# Patient Record
Sex: FEMALE | Race: WHITE | Employment: UNEMPLOYED | URBAN - METROPOLITAN AREA
[De-identification: names, ages, dates, MRNs, and addresses within clinical notes are randomized per-mention and may not be internally consistent; named-entity substitution may affect disease eponyms.]

---

## 2017-03-21 ENCOUNTER — HOSPITAL ENCOUNTER (OUTPATIENT)
Dept: ULTRASOUND IMAGING | Age: 47
Discharge: HOME | End: 2017-03-21

## 2017-03-21 ENCOUNTER — HOSPITAL ENCOUNTER (OUTPATIENT)
Dept: OTHER | Age: 47
Discharge: HOME | End: 2017-03-21
Attending: OBSTETRICS & GYNECOLOGY | Admitting: OBSTETRICS & GYNECOLOGY

## 2017-03-21 DIAGNOSIS — R10.2 PELVIC PAIN IN FEMALE: ICD-10-CM

## 2018-01-05 PROBLEM — R10.2 PELVIC PAIN IN FEMALE: Status: ACTIVE | Noted: 2018-01-05

## 2018-01-06 PROBLEM — N70.93 TUBO-OVARIAN ABSCESS: Status: ACTIVE | Noted: 2018-01-06

## 2018-01-06 PROBLEM — N70.93 LEFT TUBO-OVARIAN ABSCESS: Status: ACTIVE | Noted: 2018-01-06

## 2018-01-09 PROBLEM — D64.9 ANEMIA: Status: ACTIVE | Noted: 2018-01-09

## 2018-01-09 PROBLEM — I10 HTN (HYPERTENSION): Status: ACTIVE | Noted: 2018-01-09

## 2018-01-17 PROBLEM — T81.31XA EXTERNAL INCISIONAL DEHISCENCE: Status: ACTIVE | Noted: 2018-01-17

## 2019-09-05 ENCOUNTER — APPOINTMENT (OUTPATIENT)
Dept: GENERAL RADIOLOGY | Age: 49
End: 2019-09-05
Payer: COMMERCIAL

## 2019-09-05 ENCOUNTER — HOSPITAL ENCOUNTER (EMERGENCY)
Age: 49
Discharge: NURSING, REHAB OR LONG-TERM CARE | End: 2019-09-05
Attending: EMERGENCY MEDICINE
Payer: COMMERCIAL

## 2019-09-05 ENCOUNTER — APPOINTMENT (OUTPATIENT)
Dept: CT IMAGING | Age: 49
End: 2019-09-05
Payer: COMMERCIAL

## 2019-09-05 VITALS
HEART RATE: 80 BPM | TEMPERATURE: 98.6 F | WEIGHT: 145 LBS | DIASTOLIC BLOOD PRESSURE: 93 MMHG | OXYGEN SATURATION: 93 % | RESPIRATION RATE: 16 BRPM | SYSTOLIC BLOOD PRESSURE: 153 MMHG | BODY MASS INDEX: 23.4 KG/M2

## 2019-09-05 DIAGNOSIS — R55 NEAR SYNCOPE: Primary | ICD-10-CM

## 2019-09-05 DIAGNOSIS — I16.1 HYPERTENSIVE EMERGENCY: ICD-10-CM

## 2019-09-05 LAB
A/G RATIO: 1.2 (ref 1.1–2.2)
ALBUMIN SERPL-MCNC: 4.2 G/DL (ref 3.4–5)
ALP BLD-CCNC: 94 U/L (ref 40–129)
ALT SERPL-CCNC: 21 U/L (ref 10–40)
AMPHETAMINE SCREEN, URINE: ABNORMAL
ANION GAP SERPL CALCULATED.3IONS-SCNC: 9 MMOL/L (ref 3–16)
AST SERPL-CCNC: 21 U/L (ref 15–37)
BARBITURATE SCREEN URINE: ABNORMAL
BASOPHILS ABSOLUTE: 0 K/UL (ref 0–0.2)
BASOPHILS RELATIVE PERCENT: 0.9 %
BENZODIAZEPINE SCREEN, URINE: ABNORMAL
BILIRUB SERPL-MCNC: <0.2 MG/DL (ref 0–1)
BILIRUBIN URINE: NEGATIVE
BLOOD, URINE: NEGATIVE
BUN BLDV-MCNC: 10 MG/DL (ref 7–20)
CALCIUM SERPL-MCNC: 9.1 MG/DL (ref 8.3–10.6)
CANNABINOID SCREEN URINE: POSITIVE
CHLORIDE BLD-SCNC: 102 MMOL/L (ref 99–110)
CLARITY: CLEAR
CO2: 30 MMOL/L (ref 21–32)
COCAINE METABOLITE SCREEN URINE: ABNORMAL
COLOR: YELLOW
CREAT SERPL-MCNC: 0.6 MG/DL (ref 0.6–1.1)
EKG ATRIAL RATE: 70 BPM
EKG DIAGNOSIS: NORMAL
EKG P AXIS: 18 DEGREES
EKG P-R INTERVAL: 136 MS
EKG Q-T INTERVAL: 412 MS
EKG QRS DURATION: 86 MS
EKG QTC CALCULATION (BAZETT): 444 MS
EKG R AXIS: 88 DEGREES
EKG T AXIS: 46 DEGREES
EKG VENTRICULAR RATE: 70 BPM
EOSINOPHILS ABSOLUTE: 0.1 K/UL (ref 0–0.6)
EOSINOPHILS RELATIVE PERCENT: 2 %
ETHANOL: NORMAL MG/DL (ref 0–0.08)
GFR AFRICAN AMERICAN: >60
GFR NON-AFRICAN AMERICAN: >60
GLOBULIN: 3.6 G/DL
GLUCOSE BLD-MCNC: 109 MG/DL (ref 70–99)
GLUCOSE URINE: NEGATIVE MG/DL
HCT VFR BLD CALC: 39.9 % (ref 36–48)
HEMOGLOBIN: 13.8 G/DL (ref 12–16)
INR BLD: 0.93 (ref 0.86–1.14)
KETONES, URINE: NEGATIVE MG/DL
LEUKOCYTE ESTERASE, URINE: NEGATIVE
LYMPHOCYTES ABSOLUTE: 2 K/UL (ref 1–5.1)
LYMPHOCYTES RELATIVE PERCENT: 38.9 %
Lab: ABNORMAL
MAGNESIUM: 2.2 MG/DL (ref 1.8–2.4)
MCH RBC QN AUTO: 32.5 PG (ref 26–34)
MCHC RBC AUTO-ENTMCNC: 34.7 G/DL (ref 31–36)
MCV RBC AUTO: 93.6 FL (ref 80–100)
METHADONE SCREEN, URINE: ABNORMAL
MICROSCOPIC EXAMINATION: NORMAL
MONOCYTES ABSOLUTE: 0.5 K/UL (ref 0–1.3)
MONOCYTES RELATIVE PERCENT: 10.4 %
NEUTROPHILS ABSOLUTE: 2.4 K/UL (ref 1.7–7.7)
NEUTROPHILS RELATIVE PERCENT: 47.8 %
NITRITE, URINE: NEGATIVE
OPIATE SCREEN URINE: ABNORMAL
OXYCODONE URINE: ABNORMAL
PDW BLD-RTO: 14.2 % (ref 12.4–15.4)
PH UA: 7.5
PH UA: 7.5 (ref 5–8)
PHENCYCLIDINE SCREEN URINE: ABNORMAL
PLATELET # BLD: 209 K/UL (ref 135–450)
PMV BLD AUTO: 8.5 FL (ref 5–10.5)
POTASSIUM REFLEX MAGNESIUM: 3.5 MMOL/L (ref 3.5–5.1)
PROPOXYPHENE SCREEN: ABNORMAL
PROTEIN UA: NEGATIVE MG/DL
PROTHROMBIN TIME: 10.6 SEC (ref 9.8–13)
RBC # BLD: 4.26 M/UL (ref 4–5.2)
SODIUM BLD-SCNC: 141 MMOL/L (ref 136–145)
SPECIFIC GRAVITY UA: 1.01 (ref 1–1.03)
T4 TOTAL: 6.2 UG/DL (ref 4.5–10.9)
TOTAL PROTEIN: 7.8 G/DL (ref 6.4–8.2)
TROPONIN: <0.01 NG/ML
TSH SERPL DL<=0.05 MIU/L-ACNC: 1.82 UIU/ML (ref 0.27–4.2)
URINE TYPE: NORMAL
UROBILINOGEN, URINE: 0.2 E.U./DL
WBC # BLD: 5.1 K/UL (ref 4–11)

## 2019-09-05 PROCEDURE — 84436 ASSAY OF TOTAL THYROXINE: CPT

## 2019-09-05 PROCEDURE — 84484 ASSAY OF TROPONIN QUANT: CPT

## 2019-09-05 PROCEDURE — 2500000003 HC RX 250 WO HCPCS: Performed by: EMERGENCY MEDICINE

## 2019-09-05 PROCEDURE — 96375 TX/PRO/DX INJ NEW DRUG ADDON: CPT

## 2019-09-05 PROCEDURE — 71045 X-RAY EXAM CHEST 1 VIEW: CPT

## 2019-09-05 PROCEDURE — 93005 ELECTROCARDIOGRAM TRACING: CPT | Performed by: EMERGENCY MEDICINE

## 2019-09-05 PROCEDURE — 80053 COMPREHEN METABOLIC PANEL: CPT

## 2019-09-05 PROCEDURE — 70450 CT HEAD/BRAIN W/O DYE: CPT

## 2019-09-05 PROCEDURE — 81003 URINALYSIS AUTO W/O SCOPE: CPT

## 2019-09-05 PROCEDURE — 99284 EMERGENCY DEPT VISIT MOD MDM: CPT

## 2019-09-05 PROCEDURE — 6360000002 HC RX W HCPCS: Performed by: EMERGENCY MEDICINE

## 2019-09-05 PROCEDURE — 85025 COMPLETE CBC W/AUTO DIFF WBC: CPT

## 2019-09-05 PROCEDURE — 96374 THER/PROPH/DIAG INJ IV PUSH: CPT

## 2019-09-05 PROCEDURE — 93010 ELECTROCARDIOGRAM REPORT: CPT | Performed by: INTERNAL MEDICINE

## 2019-09-05 PROCEDURE — 83735 ASSAY OF MAGNESIUM: CPT

## 2019-09-05 PROCEDURE — 96361 HYDRATE IV INFUSION ADD-ON: CPT

## 2019-09-05 PROCEDURE — 84443 ASSAY THYROID STIM HORMONE: CPT

## 2019-09-05 PROCEDURE — 2580000003 HC RX 258: Performed by: EMERGENCY MEDICINE

## 2019-09-05 PROCEDURE — 80307 DRUG TEST PRSMV CHEM ANLYZR: CPT

## 2019-09-05 PROCEDURE — 85610 PROTHROMBIN TIME: CPT

## 2019-09-05 PROCEDURE — G0480 DRUG TEST DEF 1-7 CLASSES: HCPCS

## 2019-09-05 RX ORDER — SODIUM CHLORIDE 9 MG/ML
INJECTION, SOLUTION INTRAVENOUS CONTINUOUS
Status: DISCONTINUED | OUTPATIENT
Start: 2019-09-05 | End: 2019-09-05 | Stop reason: HOSPADM

## 2019-09-05 RX ORDER — OMEPRAZOLE 40 MG/1
40 CAPSULE, DELAYED RELEASE ORAL DAILY
COMMUNITY

## 2019-09-05 RX ORDER — MELOXICAM 15 MG/1
15 TABLET ORAL DAILY
COMMUNITY

## 2019-09-05 RX ORDER — MIRTAZAPINE 15 MG/1
15 TABLET, FILM COATED ORAL NIGHTLY
COMMUNITY

## 2019-09-05 RX ORDER — ONDANSETRON 4 MG/1
4 TABLET, FILM COATED ORAL EVERY 8 HOURS PRN
COMMUNITY

## 2019-09-05 RX ORDER — ESTRADIOL 0.03 MG/D
1 FILM, EXTENDED RELEASE TRANSDERMAL
COMMUNITY

## 2019-09-05 RX ORDER — PAROXETINE 10 MG/1
10 TABLET, FILM COATED ORAL EVERY MORNING
COMMUNITY

## 2019-09-05 RX ORDER — FLUDROCORTISONE ACETATE 0.1 MG/1
0.1 TABLET ORAL DAILY
COMMUNITY

## 2019-09-05 RX ORDER — PROMETHAZINE HYDROCHLORIDE 12.5 MG/1
12.5 TABLET ORAL EVERY 6 HOURS PRN
COMMUNITY

## 2019-09-05 RX ORDER — ONDANSETRON 2 MG/ML
4 INJECTION INTRAMUSCULAR; INTRAVENOUS ONCE
Status: COMPLETED | OUTPATIENT
Start: 2019-09-05 | End: 2019-09-05

## 2019-09-05 RX ORDER — ROPINIROLE 1 MG/1
1 TABLET, FILM COATED ORAL 3 TIMES DAILY
COMMUNITY

## 2019-09-05 RX ORDER — LISINOPRIL AND HYDROCHLOROTHIAZIDE 20; 12.5 MG/1; MG/1
1 TABLET ORAL DAILY
COMMUNITY

## 2019-09-05 RX ADMIN — ONDANSETRON 4 MG: 2 INJECTION INTRAMUSCULAR; INTRAVENOUS at 10:53

## 2019-09-05 RX ADMIN — LABETALOL HYDROCHLORIDE 10 MG: 5 INJECTION INTRAVENOUS at 10:57

## 2019-09-05 RX ADMIN — SODIUM CHLORIDE: 9 INJECTION, SOLUTION INTRAVENOUS at 10:53

## 2019-09-05 ASSESSMENT — PAIN DESCRIPTION - PAIN TYPE: TYPE: ACUTE PAIN

## 2019-09-05 ASSESSMENT — PAIN SCALES - GENERAL
PAINLEVEL_OUTOF10: 6
PAINLEVEL_OUTOF10: 0

## 2019-09-05 ASSESSMENT — PAIN DESCRIPTION - LOCATION: LOCATION: ARM

## 2019-09-05 ASSESSMENT — PAIN DESCRIPTION - ORIENTATION: ORIENTATION: LEFT

## 2019-09-05 NOTE — ED PROVIDER NOTES
Triage Chief Complaint:   Loss of Consciousness (Pt was referred to come to ER from [facility] for hypertension. She was seen this morning and SBP was over 200. Pt arrived to [facility] and passed out. Pt c/ headache, dizziness, arm tingling. )    [place]:  Aguilar Guallpa is a 44 y.o. female that presents after near syncopal episode in the [facility]. Patient apparently has recently been diagnosed with hypertension. She was started on lisinopril with HCTZ 3 days ago. She did take a dose this morning. Has not felt well for a month. Has had intermittent episodes of chest pain with the last episode a week or so ago. Has had some tingling in her arms especially on the left. Has had pressure in her head. Has been having intermittent lightheadedness. Went to [facility] where she is medicated with Suboxone. 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 Denies any substance abuse currently. States that when she went this morning they noticed her blood pressure was very elevated with a systolic over 619. Was directed to the ER however she and her  accidentally went to the [facility] and while she was there she got very dizzy and lightheaded. Initially were told she lost consciousness but she and her  both tell me she did not fully lose consciousness. She almost did. States she just does not feel well. Has had some intermittent nausea with some vomiting last week but none currently. No diarrhea. Denies current chest pain or abdominal pain. Pressure in her head has been ongoing for a month. No other specific complaints. ROS:  CONSTITUTIONAL: Denies fever, chills, sweats, weight loss. General malaise and not feeling well for a month. EYES: No visual disturbance.   No drainage, swelling, or pain  ENT: No ear pain or drainage, nasal congestion or bleeding, sore throat or difficulty swallowing  NECK: No neck pain, swelling, or mass  PULMONARY: No difficulty breathing, cough or %    Basophils % 0.9 %    Neutrophils Absolute 2.4 1.7 - 7.7 K/uL    Lymphocytes Absolute 2.0 1.0 - 5.1 K/uL    Monocytes Absolute 0.5 0.0 - 1.3 K/uL    Eosinophils Absolute 0.1 0.0 - 0.6 K/uL    Basophils Absolute 0.0 0.0 - 0.2 K/uL   Comprehensive Metabolic Panel w/ Reflex to MG   Result Value Ref Range    Sodium 141 136 - 145 mmol/L    Potassium reflex Magnesium 3.5 3.5 - 5.1 mmol/L    Chloride 102 99 - 110 mmol/L    CO2 30 21 - 32 mmol/L    Anion Gap 9 3 - 16    Glucose 109 (H) 70 - 99 mg/dL    BUN 10 7 - 20 mg/dL    CREATININE 0.6 0.6 - 1.1 mg/dL    GFR Non-African American >60 >60    GFR African American >60 >60    Calcium 9.1 8.3 - 10.6 mg/dL    Total Protein 7.8 6.4 - 8.2 g/dL    Alb 4.2 3.4 - 5.0 g/dL    Albumin/Globulin Ratio 1.2 1.1 - 2.2    Total Bilirubin <0.2 0.0 - 1.0 mg/dL    Alkaline Phosphatase 94 40 - 129 U/L    ALT 21 10 - 40 U/L    AST 21 15 - 37 U/L    Globulin 3.6 g/dL   Troponin   Result Value Ref Range    Troponin <0.01 <0.01 ng/mL   Protime-INR   Result Value Ref Range    Protime 10.6 9.8 - 13.0 sec    INR 0.93 0.86 - 1.14   TSH without Reflex   Result Value Ref Range    TSH 1.82 0.27 - 4.20 uIU/mL   Urinalysis, reflex to microscopic   Result Value Ref Range    Color, UA Yellow Straw/Yellow    Clarity, UA Clear Clear    Glucose, Ur Negative Negative mg/dL    Bilirubin Urine Negative Negative    Ketones, Urine Negative Negative mg/dL    Specific Gravity, UA 1.010 1.005 - 1.030    Blood, Urine Negative Negative    pH, UA 7.5 5.0 - 8.0    Protein, UA Negative Negative mg/dL    Urobilinogen, Urine 0.2 <2.0 E.U./dL    Nitrite, Urine Negative Negative    Leukocyte Esterase, Urine Negative Negative    Microscopic Examination Not Indicated     Urine Type Not Specified    Magnesium   Result Value Ref Range    Magnesium 2.20 1.80 - 2.40 mg/dL   EKG 12 Lead   Result Value Ref Range    Ventricular Rate 70 BPM    Atrial Rate 70 BPM    P-R Interval 136 ms    QRS Duration 86 ms    Q-T Interval 412 ms

## 2022-07-04 ENCOUNTER — HOSPITAL ENCOUNTER (EMERGENCY)
Age: 52
Discharge: HOME | End: 2022-07-04
Payer: COMMERCIAL

## 2022-07-04 VITALS
OXYGEN SATURATION: 100 % | RESPIRATION RATE: 16 BRPM | TEMPERATURE: 98.2 F | DIASTOLIC BLOOD PRESSURE: 104 MMHG | HEART RATE: 84 BPM | SYSTOLIC BLOOD PRESSURE: 163 MMHG

## 2022-07-04 DIAGNOSIS — F69 BEHAVIOR CONCERN IN ADULT: Primary | ICD-10-CM

## 2022-07-04 DIAGNOSIS — R44.3 HALLUCINATION: ICD-10-CM

## 2022-07-04 LAB
A/G RATIO: 1.7 (ref 1.1–2.2)
ACETAMINOPHEN LEVEL: <5 UG/ML (ref 10–30)
ALBUMIN SERPL-MCNC: 4.6 G/DL (ref 3.4–5)
ALP BLD-CCNC: 121 U/L (ref 40–129)
ALT SERPL-CCNC: 20 U/L (ref 10–40)
ANION GAP SERPL CALCULATED.3IONS-SCNC: 12 MMOL/L (ref 3–16)
AST SERPL-CCNC: 22 U/L (ref 15–37)
BASOPHILS ABSOLUTE: 0 K/UL (ref 0–0.2)
BASOPHILS RELATIVE PERCENT: 0.9 %
BILIRUB SERPL-MCNC: 0.3 MG/DL (ref 0–1)
BUN BLDV-MCNC: 16 MG/DL (ref 7–20)
CALCIUM SERPL-MCNC: 9.5 MG/DL (ref 8.3–10.6)
CHLORIDE BLD-SCNC: 103 MMOL/L (ref 99–110)
CO2: 24 MMOL/L (ref 21–32)
CREAT SERPL-MCNC: 0.6 MG/DL (ref 0.6–1.1)
EOSINOPHILS ABSOLUTE: 0 K/UL (ref 0–0.6)
EOSINOPHILS RELATIVE PERCENT: 0.8 %
ETHANOL: NORMAL MG/DL (ref 0–0.08)
GFR AFRICAN AMERICAN: >60
GFR NON-AFRICAN AMERICAN: >60
GLUCOSE BLD-MCNC: 90 MG/DL (ref 70–99)
HCT VFR BLD CALC: 37.9 % (ref 36–48)
HEMOGLOBIN: 12.8 G/DL (ref 12–16)
LYMPHOCYTES ABSOLUTE: 1.5 K/UL (ref 1–5.1)
LYMPHOCYTES RELATIVE PERCENT: 30.3 %
MCH RBC QN AUTO: 30.4 PG (ref 26–34)
MCHC RBC AUTO-ENTMCNC: 33.9 G/DL (ref 31–36)
MCV RBC AUTO: 89.7 FL (ref 80–100)
MONOCYTES ABSOLUTE: 0.3 K/UL (ref 0–1.3)
MONOCYTES RELATIVE PERCENT: 7.1 %
NEUTROPHILS ABSOLUTE: 3 K/UL (ref 1.7–7.7)
NEUTROPHILS RELATIVE PERCENT: 60.9 %
PDW BLD-RTO: 14.4 % (ref 12.4–15.4)
PLATELET # BLD: 210 K/UL (ref 135–450)
PMV BLD AUTO: 8.2 FL (ref 5–10.5)
POTASSIUM REFLEX MAGNESIUM: 3.8 MMOL/L (ref 3.5–5.1)
RBC # BLD: 4.22 M/UL (ref 4–5.2)
SALICYLATE, SERUM: <0.3 MG/DL (ref 15–30)
SODIUM BLD-SCNC: 139 MMOL/L (ref 136–145)
TOTAL PROTEIN: 7.3 G/DL (ref 6.4–8.2)
WBC # BLD: 4.9 K/UL (ref 4–11)

## 2022-07-04 PROCEDURE — 82077 ASSAY SPEC XCP UR&BREATH IA: CPT

## 2022-07-04 PROCEDURE — 80053 COMPREHEN METABOLIC PANEL: CPT

## 2022-07-04 PROCEDURE — 85025 COMPLETE CBC W/AUTO DIFF WBC: CPT

## 2022-07-04 PROCEDURE — 80179 DRUG ASSAY SALICYLATE: CPT

## 2022-07-04 PROCEDURE — 80143 DRUG ASSAY ACETAMINOPHEN: CPT

## 2022-07-04 PROCEDURE — 99283 EMERGENCY DEPT VISIT LOW MDM: CPT

## 2022-07-04 PROCEDURE — 36415 COLL VENOUS BLD VENIPUNCTURE: CPT

## 2022-07-04 ASSESSMENT — ENCOUNTER SYMPTOMS
ABDOMINAL PAIN: 0
COLOR CHANGE: 0
SHORTNESS OF BREATH: 0
FACIAL SWELLING: 0
RHINORRHEA: 0
SORE THROAT: 0

## 2022-07-04 ASSESSMENT — PAIN - FUNCTIONAL ASSESSMENT: PAIN_FUNCTIONAL_ASSESSMENT: 0-10

## 2022-07-04 ASSESSMENT — PAIN SCALES - GENERAL: PAINLEVEL_OUTOF10: 7

## 2022-07-04 ASSESSMENT — PAIN DESCRIPTION - LOCATION: LOCATION: BACK
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
## 2022-07-04 NOTE — ED NOTES
Level of Care Disposition:  Discharge    Patient was seen by ED provider and [facility] staff. The case was presented to psychiatric provider on-call Dr Ioana Soto. Based on the ED evaluation and information presented to the provider by this RN, it is the recommendation of the on call psychiatric provider that the patient be discharged from a psychiatric standpoint with diagnosis of amphetamine intoxication and with the following referrals for follow up for substance abuse issues.         Insurance Pre certification Authorization: N/A     Leilani Jaquez RN  07/04/22 9991

## 2022-07-04 NOTE — ED NOTES
Pt agreeable to blood draw at this time. Tolerated w/ 1 stick to left hand as pt reports she has a lot of scar tissue r/t to past hx of IV drug abuse. Pt admitted to use of methamphetamines yesterday. Reports hx of IV drug abuse, but states only smoked meth yesterday and denies daily use. Pt denies SI,HI or AVH and does not appear to be RTIS. Pt states she wants to sleep and requested lights be turned off as she has a headache.      Cordell Mcgovern, RN  07/04/22 50 Kirill Sargent RN  07/04/22 6108
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
## 2022-07-04 NOTE — ED NOTES
Ambulatory to [facility] accompanied by RN. Pt s/w tearful and answering questions with yes or no responses and unwilling to engage. Instructed on changing and obtaining urine specimen, but pt stated \"I forgot\" when she came from the bathroom. Needed to be directed into in treatment room where she immediately laid in the bed and covered up, appearing to be asleep.      Pham De Souza RN  07/04/22 3015

## 2022-07-18 ENCOUNTER — HOSPITAL ENCOUNTER (EMERGENCY)
Age: 52
Discharge: HOME | End: 2022-07-18
Payer: COMMERCIAL

## 2022-07-18 VITALS
WEIGHT: 150 LBS | TEMPERATURE: 97 F | SYSTOLIC BLOOD PRESSURE: 156 MMHG | RESPIRATION RATE: 16 BRPM | HEART RATE: 88 BPM | DIASTOLIC BLOOD PRESSURE: 88 MMHG | HEIGHT: 66 IN | BODY MASS INDEX: 24.11 KG/M2 | OXYGEN SATURATION: 98 %

## 2022-07-18 DIAGNOSIS — F19.10 POLYSUBSTANCE ABUSE (HCC): Primary | ICD-10-CM

## 2022-07-18 LAB
A/G RATIO: 1.6 (ref 1.1–2.2)
ACETAMINOPHEN LEVEL: <5 UG/ML (ref 10–30)
ALBUMIN SERPL-MCNC: 4.8 G/DL (ref 3.4–5)
ALP BLD-CCNC: 121 U/L (ref 40–129)
ALT SERPL-CCNC: 19 U/L (ref 10–40)
AMORPHOUS: ABNORMAL /HPF
AMPHETAMINE SCREEN, URINE: POSITIVE
ANION GAP SERPL CALCULATED.3IONS-SCNC: 12 MMOL/L (ref 3–16)
AST SERPL-CCNC: 26 U/L (ref 15–37)
BACTERIA: ABNORMAL /HPF
BARBITURATE SCREEN URINE: ABNORMAL
BASOPHILS ABSOLUTE: 0 K/UL (ref 0–0.2)
BASOPHILS RELATIVE PERCENT: 0.6 %
BENZODIAZEPINE SCREEN, URINE: POSITIVE
BILIRUB SERPL-MCNC: 0.3 MG/DL (ref 0–1)
BILIRUBIN URINE: ABNORMAL
BLOOD, URINE: NEGATIVE
BUN BLDV-MCNC: 22 MG/DL (ref 7–20)
CALCIUM SERPL-MCNC: 9.9 MG/DL (ref 8.3–10.6)
CANNABINOID SCREEN URINE: POSITIVE
CHLORIDE BLD-SCNC: 102 MMOL/L (ref 99–110)
CLARITY: CLEAR
CO2: 25 MMOL/L (ref 21–32)
COCAINE METABOLITE SCREEN URINE: ABNORMAL
COLOR: YELLOW
CREAT SERPL-MCNC: 1 MG/DL (ref 0.6–1.1)
EOSINOPHILS ABSOLUTE: 0 K/UL (ref 0–0.6)
EOSINOPHILS RELATIVE PERCENT: 0 %
EPITHELIAL CELLS, UA: ABNORMAL /HPF (ref 0–5)
ETHANOL: NORMAL MG/DL (ref 0–0.08)
GFR AFRICAN AMERICAN: >60
GFR NON-AFRICAN AMERICAN: 58
GLUCOSE BLD-MCNC: 101 MG/DL (ref 70–99)
GLUCOSE URINE: NEGATIVE MG/DL
HCT VFR BLD CALC: 43.9 % (ref 36–48)
HEMOGLOBIN: 14.7 G/DL (ref 12–16)
KETONES, URINE: 15 MG/DL
LEUKOCYTE ESTERASE, URINE: NEGATIVE
LYMPHOCYTES ABSOLUTE: 1.4 K/UL (ref 1–5.1)
LYMPHOCYTES RELATIVE PERCENT: 19.7 %
Lab: ABNORMAL
MCH RBC QN AUTO: 30.1 PG (ref 26–34)
MCHC RBC AUTO-ENTMCNC: 33.4 G/DL (ref 31–36)
MCV RBC AUTO: 90.2 FL (ref 80–100)
METHADONE SCREEN, URINE: ABNORMAL
MICROSCOPIC EXAMINATION: YES
MONOCYTES ABSOLUTE: 0.3 K/UL (ref 0–1.3)
MONOCYTES RELATIVE PERCENT: 4.3 %
MUCUS: ABNORMAL /LPF
NEUTROPHILS ABSOLUTE: 5.4 K/UL (ref 1.7–7.7)
NEUTROPHILS RELATIVE PERCENT: 75.4 %
NITRITE, URINE: NEGATIVE
OPIATE SCREEN URINE: ABNORMAL
OXYCODONE URINE: ABNORMAL
PDW BLD-RTO: 14.4 % (ref 12.4–15.4)
PH UA: 6
PH UA: 6 (ref 5–8)
PHENCYCLIDINE SCREEN URINE: ABNORMAL
PLATELET # BLD: 319 K/UL (ref 135–450)
PMV BLD AUTO: 8.5 FL (ref 5–10.5)
POTASSIUM REFLEX MAGNESIUM: 4 MMOL/L (ref 3.5–5.1)
PROPOXYPHENE SCREEN: ABNORMAL
PROTEIN UA: ABNORMAL MG/DL
RBC # BLD: 4.87 M/UL (ref 4–5.2)
RBC UA: ABNORMAL /HPF (ref 0–4)
SALICYLATE, SERUM: <0.3 MG/DL (ref 15–30)
SODIUM BLD-SCNC: 139 MMOL/L (ref 136–145)
SPECIFIC GRAVITY UA: 1.02 (ref 1–1.03)
TOTAL PROTEIN: 7.8 G/DL (ref 6.4–8.2)
URINE REFLEX TO CULTURE: ABNORMAL
URINE TYPE: ABNORMAL
UROBILINOGEN, URINE: 0.2 E.U./DL
WBC # BLD: 7.2 K/UL (ref 4–11)
WBC UA: ABNORMAL /HPF (ref 0–5)

## 2022-07-18 PROCEDURE — 80143 DRUG ASSAY ACETAMINOPHEN: CPT

## 2022-07-18 PROCEDURE — 82077 ASSAY SPEC XCP UR&BREATH IA: CPT

## 2022-07-18 PROCEDURE — 36415 COLL VENOUS BLD VENIPUNCTURE: CPT

## 2022-07-18 PROCEDURE — 80179 DRUG ASSAY SALICYLATE: CPT

## 2022-07-18 PROCEDURE — 81001 URINALYSIS AUTO W/SCOPE: CPT

## 2022-07-18 PROCEDURE — 80053 COMPREHEN METABOLIC PANEL: CPT

## 2022-07-18 PROCEDURE — 85025 COMPLETE CBC W/AUTO DIFF WBC: CPT

## 2022-07-18 PROCEDURE — 80307 DRUG TEST PRSMV CHEM ANLYZR: CPT

## 2022-07-18 PROCEDURE — 99285 EMERGENCY DEPT VISIT HI MDM: CPT

## 2022-07-18 ASSESSMENT — PAIN DESCRIPTION - LOCATION: LOCATION: BACK

## 2022-07-18 ASSESSMENT — PAIN DESCRIPTION - DESCRIPTORS: DESCRIPTORS: ACHING

## 2022-07-18 ASSESSMENT — PAIN DESCRIPTION - FREQUENCY: FREQUENCY: CONTINUOUS

## 2022-07-18 ASSESSMENT — PAIN SCALES - GENERAL: PAINLEVEL_OUTOF10: 3

## 2022-07-18 ASSESSMENT — ENCOUNTER SYMPTOMS
SHORTNESS OF BREATH: 0
COLOR CHANGE: 0

## 2022-07-18 ASSESSMENT — PAIN DESCRIPTION - PAIN TYPE: TYPE: CHRONIC PAIN

## 2022-07-18 ASSESSMENT — PAIN - FUNCTIONAL ASSESSMENT: PAIN_FUNCTIONAL_ASSESSMENT: 0-10

## 2022-07-18 ASSESSMENT — PAIN DESCRIPTION - ORIENTATION: ORIENTATION: LOWER

## 2022-07-18 NOTE — ED NOTES
Level of Care Disposition: discharge    Patient was seen by ED provider and [facility] staff. The case was presented to psychiatric provider on-call GARRISON Givens.    Based on the ED evaluation and information presented to the provider by [facility] , it is the recommendation of the on call psychiatric provider that the patient be discharged from a psychiatric standpoint with the following referrals: substance abuse referrals          Insurance Pre certification Authorization: JORDYN Jacobo [facility]  07/18/22 6368

## 2022-07-18 NOTE — ED NOTES
Pt has a  order.  spoke to dispatcher [facility] [place] at the [address] office in [place] at [phone] to let know that pt will be discharged.       Isak Asher, [address]  07/18/22 3396

## 2022-07-18 NOTE — ED NOTES
Patient brought back from triage. Patient changed into safety clothing. Patient belongings locked into locker. Patient oriented to [facility]. Will continue to monitor patient.       Yolanda Wilkins RN  07/18/22 5887
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
## 2022-07-18 NOTE — ED TRIAGE NOTES
Pt brought in from care home; after health evaluation staff concerned about pt behavior and delusions. [place] and stating she was going to meet God, but denies S/I or H/I; denies drugs or alcohol use.  Pt is on a bond from [facility]; pink slip taken to [facility]

## 2022-07-18 NOTE — ED NOTES
Patient being discharged home. Matteo Cleverly contacting [facility] office to notify them due to having a  order.       Sue Campo RN  07/18/22 6171

## 2022-07-18 NOTE — ED NOTES
Patient has order to discharge back to [facility]. Patient has  order from .  here to pick patient up. Patient given discharge instructions. Patient states she understands. Patient left to prison with [facility].       Mp Clark RN  07/18/22 5487

## 2022-07-18 NOTE — DISCHARGE INSTRUCTIONS
[facility] is [phone]. You can use this number at any time to access emergency mental health services. The [address] [facility] also offers a 24-hour staff crisis line at [address] [facility]: [phone]  For assistance with finding a rehab in [facility]:  accept all types of insurance  [id number] [place]     [address]:  sliding scale for in [facility] residents only  [facility]   [phone]  [facility] [facility]   [phone]  [facility]:  sliding scale for in [facility] residents only  [facility]   [phone]:  offer sliding scale regardless of residence  [facility]   [phone]:  offer sliding scale regardless of residence  [place]   [address]    [address]:  offer sliding scale regardless of residence  [facility]   [address]:  sliding scale for in [facility] residents only  [facility]   [address]:   No sliding scale - [address] [facility]   [phone]  Sliding scale regardless of residence - [facility]   [phone]:  may offer [address] [place] [address]   [id number] are being referred to [facility] which is located at [address] [facility], [place], [address]. Please call [phone] to schedule an appointment.

## 2022-11-05 ENCOUNTER — HOSPITAL ENCOUNTER (INPATIENT)
Age: 52
LOS: 1 days | Discharge: HOME | DRG: 760 | End: 2022-11-07
Attending: STUDENT IN AN ORGANIZED HEALTH CARE EDUCATION/TRAINING PROGRAM | Admitting: PSYCHIATRY & NEUROLOGY
Payer: COMMERCIAL

## 2022-11-05 DIAGNOSIS — F11.21 OPIOID DEPENDENCE IN REMISSION (HCC): Primary | ICD-10-CM

## 2022-11-05 DIAGNOSIS — R44.3 HALLUCINATIONS: ICD-10-CM

## 2022-11-05 DIAGNOSIS — R45.1 AGITATION: ICD-10-CM

## 2022-11-05 LAB
AMPHETAMINE SCREEN, URINE: NORMAL
BARBITURATE SCREEN URINE: NORMAL
BENZODIAZEPINE SCREEN, URINE: NORMAL
CANNABINOID SCREEN URINE: NORMAL
COCAINE METABOLITE SCREEN URINE: NORMAL
FENTANYL SCREEN, URINE: NORMAL
Lab: NORMAL
METHADONE SCREEN, URINE: NORMAL
OPIATE SCREEN URINE: NORMAL
OXYCODONE URINE: NORMAL
PH UA: 6
PHENCYCLIDINE SCREEN URINE: NORMAL

## 2022-11-05 PROCEDURE — 99285 EMERGENCY DEPT VISIT HI MDM: CPT

## 2022-11-05 PROCEDURE — 6370000000 HC RX 637 (ALT 250 FOR IP): Performed by: STUDENT IN AN ORGANIZED HEALTH CARE EDUCATION/TRAINING PROGRAM

## 2022-11-05 PROCEDURE — 80307 DRUG TEST PRSMV CHEM ANLYZR: CPT

## 2022-11-05 RX ORDER — OLANZAPINE 5 MG/1
10 TABLET, ORALLY DISINTEGRATING ORAL ONCE
Status: COMPLETED | OUTPATIENT
Start: 2022-11-05 | End: 2022-11-05

## 2022-11-05 RX ADMIN — OLANZAPINE 10 MG: 5 TABLET, ORALLY DISINTEGRATING ORAL at 18:47

## 2022-11-05 ASSESSMENT — PAIN - FUNCTIONAL ASSESSMENT: PAIN_FUNCTIONAL_ASSESSMENT: NONE - DENIES PAIN

## 2022-11-05 NOTE — ED NOTES
Pt was brought back to [facility] by  and ED RN. Pt was oriented to [facility], changed in to hospital safety gown, belongings secured in locker, assigned treatment room B3.       AVEL Raymundo  11/05/22 Sahil Goetz

## 2022-11-05 NOTE — ED NOTES
2 attempts made to draw lab work were unsuccessful. Pt reports that she was a IV drug user and RN is going about drawing her blood \"ass backward\". She jerked her arm away and screamed out. Lab requested to try to get specimens. Pt is focused on getting her suboxone and gabapentin, \"I'm feeling pretty bad\", reports not taking her medication today or yesterday. Urine obtained and sent to lab. Medicated with Zyprexa orally. Pt contracts for safety. Given box lunch and soda.      Sony Smith RN  11/05/22 5782

## 2022-11-06 PROBLEM — F29 PSYCHOSIS, UNSPECIFIED PSYCHOSIS TYPE (HCC): Status: ACTIVE | Noted: 2022-11-06

## 2022-11-06 PROBLEM — R44.3 HALLUCINATIONS: Status: ACTIVE | Noted: 2022-11-06

## 2022-11-06 PROBLEM — F33.9 MAJOR DEPRESSION, RECURRENT (HCC): Status: ACTIVE | Noted: 2022-11-06

## 2022-11-06 PROBLEM — B18.2 CHRONIC HEPATITIS C WITHOUT HEPATIC COMA (HCC): Status: ACTIVE | Noted: 2022-11-06

## 2022-11-06 PROBLEM — F19.10 POLYSUBSTANCE ABUSE (HCC): Status: ACTIVE | Noted: 2022-11-06

## 2022-11-06 PROBLEM — F11.21 OPIOID DEPENDENCE IN REMISSION (HCC): Status: ACTIVE | Noted: 2022-11-06

## 2022-11-06 LAB
A/G RATIO: 1.6 (ref 1.1–2.2)
ACETAMINOPHEN LEVEL: <5 UG/ML (ref 10–30)
ALBUMIN SERPL-MCNC: 3.8 G/DL (ref 3.4–5)
ALP BLD-CCNC: 96 U/L (ref 40–129)
ALT SERPL-CCNC: 12 U/L (ref 10–40)
ANION GAP SERPL CALCULATED.3IONS-SCNC: 6 MMOL/L (ref 3–16)
AST SERPL-CCNC: 18 U/L (ref 15–37)
BASOPHILS ABSOLUTE: 0 K/UL (ref 0–0.2)
BASOPHILS RELATIVE PERCENT: 0.6 %
BILIRUB SERPL-MCNC: 0.4 MG/DL (ref 0–1)
BUN BLDV-MCNC: 12 MG/DL (ref 7–20)
CALCIUM SERPL-MCNC: 8.7 MG/DL (ref 8.3–10.6)
CHLORIDE BLD-SCNC: 105 MMOL/L (ref 99–110)
CO2: 28 MMOL/L (ref 21–32)
CREAT SERPL-MCNC: 0.6 MG/DL (ref 0.6–1.1)
EOSINOPHILS ABSOLUTE: 0.1 K/UL (ref 0–0.6)
EOSINOPHILS RELATIVE PERCENT: 1.7 %
ETHANOL: NORMAL MG/DL (ref 0–0.08)
GFR SERPL CREATININE-BSD FRML MDRD: >60 ML/MIN/{1.73_M2}
GLUCOSE BLD-MCNC: 94 MG/DL (ref 70–99)
HCT VFR BLD CALC: 38.6 % (ref 36–48)
HEMOGLOBIN: 12.8 G/DL (ref 12–16)
LYMPHOCYTES ABSOLUTE: 2.9 K/UL (ref 1–5.1)
LYMPHOCYTES RELATIVE PERCENT: 52.8 %
MCH RBC QN AUTO: 30.2 PG (ref 26–34)
MCHC RBC AUTO-ENTMCNC: 33.3 G/DL (ref 31–36)
MCV RBC AUTO: 90.8 FL (ref 80–100)
MONOCYTES ABSOLUTE: 0.6 K/UL (ref 0–1.3)
MONOCYTES RELATIVE PERCENT: 10.1 %
NEUTROPHILS ABSOLUTE: 1.9 K/UL (ref 1.7–7.7)
NEUTROPHILS RELATIVE PERCENT: 34.8 %
PDW BLD-RTO: 15.1 % (ref 12.4–15.4)
PLATELET # BLD: 216 K/UL (ref 135–450)
PMV BLD AUTO: 9.1 FL (ref 5–10.5)
POTASSIUM REFLEX MAGNESIUM: 3.6 MMOL/L (ref 3.5–5.1)
RBC # BLD: 4.25 M/UL (ref 4–5.2)
SALICYLATE, SERUM: <0.3 MG/DL (ref 15–30)
SARS-COV-2, NAAT: NOT DETECTED
SODIUM BLD-SCNC: 139 MMOL/L (ref 136–145)
TOTAL PROTEIN: 6.2 G/DL (ref 6.4–8.2)
TSH SERPL DL<=0.05 MIU/L-ACNC: 4.14 UIU/ML (ref 0.27–4.2)
WBC # BLD: 5.5 K/UL (ref 4–11)

## 2022-11-06 PROCEDURE — 6370000000 HC RX 637 (ALT 250 FOR IP): Performed by: PSYCHIATRY & NEUROLOGY

## 2022-11-06 PROCEDURE — 80143 DRUG ASSAY ACETAMINOPHEN: CPT

## 2022-11-06 PROCEDURE — 6360000002 HC RX W HCPCS: Performed by: PSYCHIATRY & NEUROLOGY

## 2022-11-06 PROCEDURE — 87635 SARS-COV-2 COVID-19 AMP PRB: CPT

## 2022-11-06 PROCEDURE — 99221 1ST HOSP IP/OBS SF/LOW 40: CPT | Performed by: NURSE PRACTITIONER

## 2022-11-06 PROCEDURE — 84443 ASSAY THYROID STIM HORMONE: CPT

## 2022-11-06 PROCEDURE — 80053 COMPREHEN METABOLIC PANEL: CPT

## 2022-11-06 PROCEDURE — 36415 COLL VENOUS BLD VENIPUNCTURE: CPT

## 2022-11-06 PROCEDURE — 82077 ASSAY SPEC XCP UR&BREATH IA: CPT

## 2022-11-06 PROCEDURE — 1240000000 HC EMOTIONAL WELLNESS R&B

## 2022-11-06 PROCEDURE — 85025 COMPLETE CBC W/AUTO DIFF WBC: CPT

## 2022-11-06 PROCEDURE — 80061 LIPID PANEL: CPT

## 2022-11-06 PROCEDURE — 80179 DRUG ASSAY SALICYLATE: CPT

## 2022-11-06 PROCEDURE — 83036 HEMOGLOBIN GLYCOSYLATED A1C: CPT

## 2022-11-06 RX ORDER — ACETAMINOPHEN 325 MG/1
650 TABLET ORAL EVERY 4 HOURS PRN
Status: DISCONTINUED | OUTPATIENT
Start: 2022-11-06 | End: 2022-11-07 | Stop reason: HOSPADM

## 2022-11-06 RX ORDER — BENZTROPINE MESYLATE 1 MG/ML
2 INJECTION INTRAMUSCULAR; INTRAVENOUS 2 TIMES DAILY PRN
Status: DISCONTINUED | OUTPATIENT
Start: 2022-11-06 | End: 2022-11-07 | Stop reason: HOSPADM

## 2022-11-06 RX ORDER — GABAPENTIN 600 MG/1
TABLET ORAL
Status: ON HOLD | COMMUNITY
Start: 2022-08-06 | End: 2022-11-07

## 2022-11-06 RX ORDER — BUPRENORPHINE HYDROCHLORIDE AND NALOXONE HYDROCHLORIDE DIHYDRATE 8; 2 MG/1; MG/1
1 TABLET SUBLINGUAL
COMMUNITY
Start: 2022-08-10

## 2022-11-06 RX ORDER — BUPRENORPHINE HYDROCHLORIDE AND NALOXONE HYDROCHLORIDE DIHYDRATE 8; 2 MG/1; MG/1
1 TABLET SUBLINGUAL DAILY
Status: DISCONTINUED | OUTPATIENT
Start: 2022-11-06 | End: 2022-11-07 | Stop reason: HOSPADM

## 2022-11-06 RX ORDER — MAGNESIUM HYDROXIDE/ALUMINUM HYDROXICE/SIMETHICONE 120; 1200; 1200 MG/30ML; MG/30ML; MG/30ML
30 SUSPENSION ORAL EVERY 6 HOURS PRN
Status: DISCONTINUED | OUTPATIENT
Start: 2022-11-06 | End: 2022-11-07 | Stop reason: HOSPADM

## 2022-11-06 RX ORDER — POLYETHYLENE GLYCOL 3350 17 G
2 POWDER IN PACKET (EA) ORAL
Status: DISCONTINUED | OUTPATIENT
Start: 2022-11-06 | End: 2022-11-07 | Stop reason: HOSPADM

## 2022-11-06 RX ORDER — HYDROXYZINE 50 MG/1
50 TABLET, FILM COATED ORAL 3 TIMES DAILY PRN
Status: DISCONTINUED | OUTPATIENT
Start: 2022-11-06 | End: 2022-11-07 | Stop reason: HOSPADM

## 2022-11-06 RX ORDER — TRAZODONE HYDROCHLORIDE 50 MG/1
50 TABLET ORAL NIGHTLY PRN
Status: DISCONTINUED | OUTPATIENT
Start: 2022-11-06 | End: 2022-11-07 | Stop reason: HOSPADM

## 2022-11-06 RX ORDER — OLANZAPINE 10 MG/1
10 TABLET ORAL EVERY 4 HOURS PRN
Status: DISCONTINUED | OUTPATIENT
Start: 2022-11-06 | End: 2022-11-07 | Stop reason: HOSPADM

## 2022-11-06 RX ORDER — NICOTINE 21 MG/24HR
1 PATCH, TRANSDERMAL 24 HOURS TRANSDERMAL DAILY
Status: DISCONTINUED | OUTPATIENT
Start: 2022-11-06 | End: 2022-11-07 | Stop reason: HOSPADM

## 2022-11-06 RX ADMIN — HYDROXYZINE HYDROCHLORIDE 50 MG: 50 TABLET ORAL at 23:02

## 2022-11-06 RX ADMIN — TRAZODONE HYDROCHLORIDE 50 MG: 50 TABLET ORAL at 21:30

## 2022-11-06 RX ADMIN — BUPRENORPHINE HYDROCHLORIDE AND NALOXONE HYDROCHLORIDE DIHYDRATE 1 TABLET: 8; 2 TABLET SUBLINGUAL at 10:11

## 2022-11-06 RX ADMIN — OLANZAPINE 10 MG: 10 TABLET, FILM COATED ORAL at 18:25

## 2022-11-06 ASSESSMENT — SLEEP AND FATIGUE QUESTIONNAIRES
DO YOU HAVE DIFFICULTY SLEEPING: YES
DO YOU USE A SLEEP AID: YES
AVERAGE NUMBER OF SLEEP HOURS: 2
SLEEP PATTERN: RESTLESSNESS;DIFFICULTY FALLING ASLEEP

## 2022-11-06 ASSESSMENT — LIFESTYLE VARIABLES
HOW MANY STANDARD DRINKS CONTAINING ALCOHOL DO YOU HAVE ON A TYPICAL DAY: PATIENT DOES NOT DRINK
HOW OFTEN DO YOU HAVE A DRINK CONTAINING ALCOHOL: NEVER

## 2022-11-06 NOTE — ED NOTES
Pt is currently in bed resting. Will continue to monitor for safety.      Clara Sousa  11/06/22 0109

## 2022-11-06 NOTE — PROGRESS NOTES
Pt requesting something for her nerves. She appears anxious and paranoid. Given zyprexa @ [phone]. Will monitor for effectiveness.
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
## 2022-11-06 NOTE — PROGRESS NOTES
[address] [facility]  Admission Note     Admission Type:   Admission Type: Involuntary    Reason for admission:  Reason for Admission: Psychosis      Addictive Behavior:   Addictive Behavior  In the Past 3 Months, Have You Felt or Has Someone Told You That You Have a Problem With  : None    Medical Problems:   Past Medical History:   Diagnosis Date    Dysmenorrhea     Endometriosis     no surgery to confirm    Hepatitis C 07/2017    Irregular uterine bleeding        Status EXAM:  Mental Status and Behavioral Exam  Normal: No  Level of Assistance: Independent/Self  Facial Expression: Flat  Affect: Blunt  Level of Consciousness: Alert  Frequency of Checks: 4 times per hour, close  Mood:Normal: No  Mood: Irritable, Anxious  Motor Activity:Normal: Yes  Eye Contact: Good  Observed Behavior: Cooperative, Guarded  Sexual Misconduct History: Current - no  Preception: Sage to person, Sage to place, Sage to situation  Attention:Normal: No  Attention: Distractible, Unable to concentrate  Thought Processes: Circumstantial  Thought Content:Normal: No  Thought Content: Paranoia  Depression Symptoms: Appetite change, Change in energy level, Loss of interest  Anxiety Symptoms: Generalized  Love Symptoms: Poor judgment  Hallucinations:  Auditory (comment) (Old neighbor saying things to her)  Delusions: Yes  Delusions: Paranoid  Memory:Normal: No  Memory: Poor recent, Poor remote  Insight and Judgment: No  Insight and Judgment: Poor judgment, Poor insight    Tobacco Screening:  Practical Counseling, on admission, adryan X, if applicable and completed (first 3 are required if patient doesn't refuse):            ( ) Recognizing danger situations (included triggers and roadblocks)                    ( ) Coping skills (new ways to manage stress,relaxation techniques, changing routine, distraction)                                                           ( ) Basic information about quitting (benefits of quitting, techniques in how to quit, available resources  ( ) Referral for counseling faxed to [facility]                                                                                                                   ( ) (x)Patient refused counseling  ( ) Patient has not smoked in the last 30 days    Metabolic Screening:    No results found for: LABA1C    No results found for: CHOL  No results found for: TRIG  No results found for: HDL  No components found for: LDLCAL  No results found for: LABVLDL      Body mass index is 20.98 kg/m². BP Readings from Last 2 Encounters:   11/06/22 (!) 142/67   07/18/22 (!) 156/88           Pt admitted with followings belongings:  Dental Appliances: None  Vision - Corrective Lenses: None  Hearing Aid: None  Jewelry: None  Body Piercings Removed: N/A  Clothing: Footwear, Pants, Shirt, Other (Comment) (sweatshirt)  Other Valuables:  Other (Comment) (N/A)    Nahomi Rosado RN

## 2022-11-06 NOTE — BH NOTE
Level of Care Disposition: Admit      Patient was seen by ED provider and [facility] staff. The case presented to psychiatric provider on-call Dr Haja Rodriguez. Based on the ED evaluation and information presented to the provider by Sujatha Adame RN it is the recommendation of the on call psychiatric provider that inpatient hospitalization is the least restrictive environment for the patient at this time. The patient will be admitted to the inpatient unit.  Admitting provider did not order suicide precautions based on patient contracts for safety while in the hospital.

## 2022-11-06 NOTE — PLAN OF CARE
Problem: Anxiety  Goal: Will report anxiety at manageable levels  Description: INTERVENTIONS:  1. Administer medication as ordered  2. Teach and rehearse alternative coping skills  3. Provide emotional support with 1:1 interaction with staff  Outcome: Progressing     Problem: Confusion  Goal: Confusion, delirium, dementia, or psychosis is improved or at baseline  Description: INTERVENTIONS:  1. Assess for possible contributors to thought disturbance, including medications, impaired vision or hearing, underlying metabolic abnormalities, dehydration, psychiatric diagnoses, and notify attending LIP  2. [place] high risk fall precautions, as indicated  3. Provide frequent short contacts to provide reality reorientation, refocusing and direction  Outcome: Progressing     Problem: Behavior  Goal: Pt/Family maintain appropriate behavior and adhere to behavioral management agreement, if implemented  Description: INTERVENTIONS:  1. Assess patient/family's coping skills and  non-compliant behavior (including use of illegal substances)  2. Notify security of behavior or suspected illegal substances which indicate the need for search of the family and/or belongings  3. Encourage verbalization of thoughts and concerns in a socially appropriate manner  Outcome: Progressing    Pt is alert and oriented x4. Pt has been mostly withdrawn to her room. Guarded during interviews. Mostly cooperative and pleasant but became irritable during assessment this morning due to being preoccupied w/ getting her medications. Good appetite, did come out to dayroom to eat dinner but has not been social w/ peers. Medication compliant whole. Denies SI/HI/AVH, no RTIS noted or delusions voiced. No paranoia noted. Pt is independent and continent. Will continue to monitor.
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
## 2022-11-06 NOTE — PROGRESS NOTES
Behavioral Services  Medicare Certification Upon Admission    I certify that this patient's inpatient psychiatric hospital admission is medically necessary for:    [x] (1) Treatment which could reasonably be expected to improve this patient's condition,       [x] (2) Or for diagnostic study;     AND     [x](2) The inpatient psychiatric services are provided while the individual is under the care of a physician and are included in the individualized plan of care.     Estimated length of stay/service 5 d    Plan for post-hospital care outpt    Electronically signed by Santi Baker MD on 11/6/2022 at 9:00 AM

## 2022-11-06 NOTE — ED NOTES
Pt resting in bed. No concerns/complaints. Will continue to monitor for safety.      Eunice Vizcarra  11/06/22 0018       Eunice Vizcarra  11/06/22 0018

## 2022-11-06 NOTE — PROGRESS NOTES
4 Eyes Skin Assessment     The patient is being assessed for  Admission    I agree that 2 RN's have performed a thorough Head to Toe Skin Assessment on the patient. ALL assessment sites listed below have been assessed. Areas assessed for pressure by both nurses:   [x]   Head, Face, and Ears   [x]   Shoulders, Back, and Chest  [x]   Arms, Elbows, and Hands   []   Coccyx, Sacrum, and Ischum  [x]   Legs, Feet, and Heels                                Skin Assessed Under all Medical Devices by both nurses:  N/A               All Mepilex Borders were peeled back and area peeked at by both nurses:  No: No Mepilex   Please list where Mepilex Borders are located:  N/A                 Does the Patient have Skin Breakdown related to pressure?   No              Kirk Prevention initiated:  No   Wound Care Orders initiated:  No      [facility] nurse consulted for Pressure Injury (Stage 3,4, Unstageable, DTI, NWPT, Complex wounds)and New or Established Ostomies:  No        Nurse 1 eSignature: Electronically signed by Shea Pizano RN on 11/6/22 at 5:54 AM EST    **SHARE this note so that the co-signing nurse is able to place an eSignature**    Nurse 2 eSignature: {Esignature:[id number]}
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
## 2022-11-06 NOTE — ED NOTES
Pt was up to BR voiced no concerns/complaints. Currently back in bed resting. Will continue to monitor for safety.        Alethea Cedillo  11/06/22 0016

## 2022-11-06 NOTE — ED NOTES
Pt is currently in bed resting. Vitals are stable. Voiced no concerns at this time. Will continue to monitor for safety.      Addie Line  11/05/22 4894

## 2022-11-06 NOTE — ED NOTES
Pt was transferred to admitting unit [facility]. Patient was in safety gown and belongings and paperwork were transferred to admitting unit. Patient was transported to unit via wheelchair, and escorted by this writer and . Patient was updated on plan of care and was accepting of plan to admit.             Lynette Kwok  11/06/22 9191
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
## 2022-11-06 NOTE — PROGRESS NOTES
Patient arrived on the unit via wheelchair accompanied by Security and [facility] AN AFFILIATE OF [facility] staff BODØ. Vital signs obtained, snack and fluids given.
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
## 2022-11-07 VITALS
HEART RATE: 77 BPM | DIASTOLIC BLOOD PRESSURE: 84 MMHG | OXYGEN SATURATION: 98 % | BODY MASS INDEX: 20.89 KG/M2 | TEMPERATURE: 97.8 F | RESPIRATION RATE: 16 BRPM | SYSTOLIC BLOOD PRESSURE: 120 MMHG | HEIGHT: 66 IN | WEIGHT: 130 LBS

## 2022-11-07 LAB
CHOLESTEROL, TOTAL: 203 MG/DL (ref 0–199)
HDLC SERPL-MCNC: 53 MG/DL (ref 40–60)
LDL CHOLESTEROL CALCULATED: 136 MG/DL
TRIGL SERPL-MCNC: 69 MG/DL (ref 0–150)
VLDLC SERPL CALC-MCNC: 14 MG/DL

## 2022-11-07 PROCEDURE — 6360000002 HC RX W HCPCS: Performed by: PSYCHIATRY & NEUROLOGY

## 2022-11-07 PROCEDURE — 5130000000 HC BRIDGE APPOINTMENT

## 2022-11-07 PROCEDURE — 6370000000 HC RX 637 (ALT 250 FOR IP): Performed by: PSYCHIATRY & NEUROLOGY

## 2022-11-07 RX ORDER — BUPRENORPHINE HYDROCHLORIDE AND NALOXONE HYDROCHLORIDE DIHYDRATE 8; 2 MG/1; MG/1
1 TABLET SUBLINGUAL DAILY
Qty: 2 TABLET | Refills: 0 | Status: SHIPPED | OUTPATIENT
Start: 2022-11-08 | End: 2022-11-10

## 2022-11-07 RX ORDER — BUPRENORPHINE HYDROCHLORIDE AND NALOXONE HYDROCHLORIDE DIHYDRATE 8; 2 MG/1; MG/1
1 TABLET SUBLINGUAL DAILY
Qty: 2 TABLET | Refills: 0 | Status: SHIPPED | OUTPATIENT
Start: 2022-11-08 | End: 2022-11-07 | Stop reason: SDUPTHER

## 2022-11-07 RX ADMIN — BUPRENORPHINE HYDROCHLORIDE AND NALOXONE HYDROCHLORIDE DIHYDRATE 1 TABLET: 8; 2 TABLET SUBLINGUAL at 08:47

## 2022-11-07 NOTE — PLAN OF CARE
Problem: Anxiety  Goal: Will report anxiety at manageable levels  Description: INTERVENTIONS:  1. Administer medication as ordered  2. Teach and rehearse alternative coping skills  3. Provide emotional support with 1:1 interaction with staff  Outcome: Progressing  Flowsheets (Taken 11/7/2022 0941)  Will report anxiety at manageable levels:   Administer medication as ordered   Teach and rehearse alternative coping skills   Provide emotional support with 1:1 interaction with staff     Problem: Confusion  Goal: Confusion, delirium, dementia, or psychosis is improved or at baseline  Description: INTERVENTIONS:  1. Assess for possible contributors to thought disturbance, including medications, impaired vision or hearing, underlying metabolic abnormalities, dehydration, psychiatric diagnoses, and notify attending LIP  2. [place] high risk fall precautions, as indicated  3. Provide frequent short contacts to provide reality reorientation, refocusing and direction  4. Decrease environmental stimuli, including noise as appropriate  5. Monitor and intervene to maintain adequate nutrition, hydration, elimination, sleep and activity  6. If unable to ensure safety without constant attention obtain sitter and review sitter guidelines with assigned personnel  7. Initiate Psychosocial CNS and Spiritual Care consult, as indicated  11/7/2022 1005 by Sue Anthony RN  Outcome: Progressing  Flowsheets (Taken 11/7/2022 0941)  Effect of thought disturbance (confusion, delirium, dementia, or psychosis) are managed with adequate functional status: Decrease environmental stimuli, including noise as appropriate  11/6/2022 2151 by Librado Leon LPN  Outcome: Progressing     Problem: Behavior  Goal: Pt/Family maintain appropriate behavior and adhere to behavioral management agreement, if implemented  Description: INTERVENTIONS:  1.  Assess patient/family's coping skills and  non-compliant behavior (including use of illegal substances)  2. Notify security of behavior or suspected illegal substances which indicate the need for search of the family and/or belongings  3. Encourage verbalization of thoughts and concerns in a socially appropriate manner  4. Utilize positive, consistent limit setting strategies supporting safety of patient, staff and others  5. Encourage participation in the decision making process about the behavioral management agreement  6. If a visitor's behavior poses a threat to safety call refer to organization policy. 7. Initiate consult with , Psychosocial CNS, Spiritual Care as appropriate  11/7/2022 1005 by Lenny Armendariz RN  Outcome: Progressing  [address] (Taken 11/7/2022 3820)  Patient/family maintains appropriate behavior and adheres to behavioral management agreement, if implemented:   Encourage verbalization of thoughts and concerns in a socially appropriate manner   Utilize positive, consistent limit setting strategies supporting safety of patient, staff and others  11/6/2022 2151 by Fern Victoria LPN  Outcome: Progressing   Pt alert and oriented x3. Denies all. When asked reasoning for this hospitalization pt stated \"I'm just having some problems with my . I had a warrant out for my arrest. I just have things going on. \" Pt requested buprenorphine and took as prescribed. Pt repeatedly requests gabapentin, RN explained she would need to speak with prescriber. Pt withdrawn to room. Appetite good.

## 2022-11-07 NOTE — FLOWSHEET NOTE
Senior Purposeful Rounding    Position:Repositions Self     Physical Environment:Room free from clutter, Clear path to bathroom , Adequate lighting, and No safety hazards noted     Pain Rating/ Nonverbal Pain Behaviors:0     Pain interventions Attempted: none observed     Patient Toileted:Continent and Independent

## 2022-11-07 NOTE — FLOWSHEET NOTE
Senior Purposeful Rounding    Position:Repositions Self    Physical Environment:Room free from clutter, Clear path to bathroom , Adequate lighting, and No safety hazards noted    Pain Rating/ Nonverbal Pain Behaviors:0;     Pain interventions Attempted: none observed    Patient Toileted:Continent and Independent

## 2022-11-07 NOTE — FLOWSHEET NOTE
Senior Purposeful Rounding    Position:Repositions Self    Physical Environment:Room free from clutter, Clear path to bathroom , Adequate lighting, and No safety hazards noted    Pain Rating/ Nonverbal Pain Behaviors:0    Pain interventions Attempted: na    Patient Toileted:Independent

## 2022-11-07 NOTE — DISCHARGE SUMMARY
[facility]  Discharge Note    Pt discharged with followings belongings:   Dental Appliances: None  Vision - Corrective Lenses: None  Hearing Aid: None  Jewelry: None  Body Piercings Removed: N/A  Clothing: Footwear, Pants, Shirt, Other (Comment) (sweatshirt)  Other Valuables: Other (Comment) (N/A)   Valuables sent home with patient or returned to patient. Patient educated on aftercare instructions: yes  Information faxed to [phone] by RN  at 3:09 PM .Patient verbalize understanding of AVS:  .    Status EXAM upon discharge:  Mental Status and Behavioral Exam  Normal: No  Level of Assistance: Independent/Self  Facial Expression: Brightened  Affect: Blunt  Level of Consciousness: Alert  Frequency of Checks: 4 times per hour, close  Mood:Normal: No  Mood: Anxious  Motor Activity:Normal: Yes  Eye Contact: Fair  Observed Behavior: Withdrawn, Guarded  Sexual Misconduct History: Current - no  Preception: [place] to person, [place] to time, [place] to place  Attention:Normal: No  Attention: Distractible  Thought Processes: Blocking  Thought Content:Normal: No  Thought Content: Poverty of content  Depression Symptoms: Increased irritability, Isolative  Anxiety Symptoms: Generalized  Love Symptoms: No problems reported or observed. Hallucinations: Other (comment) (denies)  Delusions: No  Delusions:  Other (comment) (none noted)  Memory:Normal: Yes  Memory:  (wnl)  Insight and Judgment: No  Insight and Judgment: Poor judgment, Poor insight    Tobacco Screening:  Practical Counseling, on admission, adryan X, if applicable and completed (first 3 are required if patient doesn't refuse):            ( ) Recognizing danger situations (included triggers and roadblocks)                    ( ) Coping skills (new ways to manage stress,relaxation techniques, changing routine, distraction)                                                           ( ) Basic information about quitting (benefits of quitting, techniques in how to quit, available resources  ( ) [facility]                                                                                                                   (x) Patient refused counseling  (x) Patient refused referral  (x) Patient refused prescription upon discharge  ( ) Patient has not smoked in the last 30 days    Metabolic Screening:    No results found for: LABA1C    No results found for: CHOL  No results found for: TRIG  No results found for: HDL  No components found for: LDLCAL  No results found for: [address] [facility] Appointment completed: Reviewed Discharge Instructions with patient. Patient verbalizes understanding and agreement with the discharge plan using the teachback method.      Referral for Outpatient Tobacco Cessation Counseling, upon discharge (adryan X if applicable and completed):    ( )  Hospital staff assisted patient to call Quit Line or faxed referral                                   during hospitalization                  ( )  Recognizing danger situations (included triggers and roadblocks), if not completed on admission                    ( )  Coping skills (new ways to manage stress, exercise, relaxation techniques, changing routine, distraction), if not completed on admission                                                           ( )  Basic information about quitting (benefits of quitting, techniques in how to quit, available resources, if not completed on admission  ( ) Referral for counseling faxed to [facility]   ( x) Patient refused referral  ( x) Patient refused counseling  ( x) Patient refused smoking cessation medication upon discharge    Vaccinations (adryan X if applicable and completed):  ( ) Patient states already received influenza vaccine elsewhere  ( ) Patient received influenza vaccine during this hospitalization  ( x) Patient refused influenza vaccine at this time    Caren Bunch RN

## 2022-11-07 NOTE — DISCHARGE INSTRUCTIONS
Advanced Directives:  Patient does not have a surrogate decision maker appointed     Patient does not have a psychiatric and/ or medical advanced directive or power of . Patient was not offered psychiatric and/ or medical advanced directive or power of  information/completion but declined to complete   Why not? Not clinically appropriate at this time    Discharge Planning is Complete. Discharge Date: 11/07/2022  Reason for Hospitalization: Inability to Care For Self  Discharge Diagnosis: Psychosis, unspecified, major depressive disorder recurrent  Discharging to: Private Residence    Your instructions: Your physician here was Uriel Brandt MD. If you have any questions please call the unit at [phone] for the adult unit or [phone] for [facility]. Please note that we have a patient family advisory [place] that meets the second Wednesday of January and the second Wednesday of July at [address] [facility] in [place] at [facility]. Department leadership would love for you to attend to give feedback on what we are doing well and areas in which we can improve our patient care. Please come if you are able and feel free to reach out to [facility][note 3] 60 at [phone] with any questions. The crisis number for [facility] is [phone] (SAVE). This crisis line is available 24 hours a day, seven days a week. Please follow up with your PCP regarding any pending labs.      Your next appointment is:  Name of Provider: Dr. Alejo Kennedy   Provider specialty/license: Primary Care   Date and time of appointment: Thursday, November 17, 3:45   The type/s of services requested are: [facility] name: [facility]  Address: [address]  Phone Number: [phone]  Special instructions (what to bring to appointment, etc.): Please bring a photo ID, insurance card, and current medications/medication list. Other appointments:   Name of Provider: [facility])  Provider specialty/license: Psychiatry   Date and time of appointment: Wednesday, November 9, 2022 at 9:00 am (Suggested Walk-in Time)  The type/s of services requested are: [address] [place] [address]  name: Paris  Address: [address] [place] [address], [facility] Teresa Cruz [place],[address]  Phone Number: [phone]  Special instructions (what to bring to appointment, etc.): [facility] offers walk-in registration for new clients Monday - Friday, 8:30 - 12. We recommend you go as early in the day as possible to decrease your possible wait time. When you go, please bring a photo i.d., proof of residence, proof of household income, and insurance cards. Discharge Completed By: AVEL Nelson  Fax to: [facility]: [phone]    The following personal items were collected during your admission and were returned to you:    Belongings  Dental Appliances: None  Vision - Corrective Lenses: None  Hearing Aid: None  Clothing: Footwear, Pants, Shirt, Other (Comment) (sweatshirt)  Jewelry: None  Body Piercings Removed: N/A  Electronic Devices: None  Weapons (Notify Protective Services/Security): None  Other Valuables: Other (Comment) (N/A)  Home Medications: None  Valuables Given To: Agnes  Provide Name(s) of Who Valuable(s) Were Given To: N/A  Responsible person(s) in the waiting room: N/A  Patient approves for provider to speak to responsible person post operatively: No (N/A)    By signing below, I understand and acknowledge receipt of the instructions indicated above.

## 2022-11-07 NOTE — PLAN OF CARE
Problem: Confusion  Goal: Confusion, delirium, dementia, or psychosis is improved or at baseline  Description: INTERVENTIONS:  1. Assess for possible contributors to thought disturbance, including medications, impaired vision or hearing, underlying metabolic abnormalities, dehydration, psychiatric diagnoses, and notify attending LIP  2. [place] high risk fall precautions, as indicated  3. Provide frequent short contacts to provide reality reorientation, refocusing and direction  4. Decrease environmental stimuli, including noise as appropriate  5. Monitor and intervene to maintain adequate nutrition, hydration, elimination, sleep and activity  6. If unable to ensure safety without constant attention obtain sitter and review sitter guidelines with assigned personnel  7. Initiate Psychosocial CNS and Spiritual Care consult, as indicated  11/6/2022 2151 by Letty Holt LPN  Outcome: Progressing     Problem: Behavior  Goal: Pt/Family maintain appropriate behavior and adhere to behavioral management agreement, if implemented  Description: INTERVENTIONS:  1. Assess patient/family's coping skills and  non-compliant behavior (including use of illegal substances)  2. Notify security of behavior or suspected illegal substances which indicate the need for search of the family and/or belongings  3. Encourage verbalization of thoughts and concerns in a socially appropriate manner  4. Utilize positive, consistent limit setting strategies supporting safety of patient, staff and others  5. Encourage participation in the decision making process about the behavioral management agreement  6. If a visitor's behavior poses a threat to safety call refer to organization policy. 7. Initiate consult with , Psychosocial CNS, Spiritual Care as appropriate  11/6/2022 2151 by Letty Holt LPN  Outcome: Progressing   Patient has been some what isolative to room this evening.  Cooperative with care and conversation is very polite. Denies any suicidal, homicidal or hallucinations with asked. Alert to person, place and time. PRN trazodone given to assist with sleep at patients request. No mention of anxiety this evening. Will continue to monitor patient through shift for symptoms and safety.

## 2022-11-07 NOTE — FLOWSHEET NOTE
Senior Purposeful Rounding    Position:Repositions Self    Physical Environment:Room free from clutter, Clear path to bathroom , Adequate lighting, and No safety hazards noted    Pain Rating/ Nonverbal Pain Behaviors:0    Pain interventions Attempted: na    Patient Toileted:Continent and Independent

## 2022-11-07 NOTE — DISCHARGE SUMMARY
Discharge Summary   Admit Date: 11/5/2022   Discharge Date:    11/7/2022  Condition at DC stable  Spent over 40 minutes with patient and staff on [facility] with more than 50 % of time spent with patient discussing care  Final Dx: axis I: Psychosis, unspecified psychosis type ([address]   Axis 2: No diagnosis  [place] 3: See Medical History    And Present on Admission:   Psychosis, unspecified psychosis type ([address]   Major depression, recurrent ([address]   Hallucinations   Polysubstance abuse ([address]   Hypertension, essential   Chronic hepatitis C without hepatic coma ([address]   Opioid dependence in remission ([address]     Axis 4: Problems related to the social environment and Other psychosocial and environmental problems  Axis 5:  On Admission: 31-40 impairment in reality testing At Discharge: 61-70 mild symptoms   All conditions on Axis 1 and Axis 2 and active problems on Axis 3 were treated while patient was hospitalized. [facility] Problems    Diagnosis Date Noted    Psychosis, unspecified psychosis type ([address] [F29] 11/06/2022     Priority: Medium    Major depression, recurrent ([address] [F33.9] 11/06/2022     Priority: Medium    Hallucinations [R44.3] 11/06/2022     Priority: Medium    Polysubstance abuse ([address] [F19.10] 11/06/2022     Priority: Medium    Chronic hepatitis C without hepatic coma ([address] [B18.2] 11/06/2022     Priority: Medium    Opioid dependence in remission [facility]) [F11.21] 11/06/2022     Priority: Medium    Hypertension, essential [I10] 01/09/2018   )   Condition on DC  Mood and affect are stable and pt is not suicidal   VITALS:  /84   Pulse 77   Temp 97.8 °F (36.6 °C) (Temporal)   Resp 16   Ht 5' 6\" (1.676 m)   Wt 130 lb (59 kg)   LMP 08/30/2010   SpO2 98%   BMI 20.98 kg/m²   Brief Summary Present Illness     CC:  Delusion/Hallucination/agitation        HPI:   Patient seen in room on Adult Behavioral Unit.    Patient is a 45 y.o. female who presents to  ED via police from shelter where her daughter sent her for bizarre behavior. Pt reports she was in half-way then brought to the ED and doesn't want to give anymore information because she currently having withdrawal symptoms. She stated \"having the shakes and irritable right now\". Pt also stated she needs her Buprenorphine, Gabapentin and the last time she had both medications was 3 days ago. Per ED report: Police report that patient was being paranoid and attempted to take a knife into the shower station. Pt told ED staff that her family has been taking advantage her and stealing her social security checks. Also stated she been hearing voices after using hair coloring product from [facility] that she believes is a normal side effects. Per chart review 7/4/22 collateral from daughter reports the pt having extensive history for several pink slips. The pt was at [facility] treatment center [facility] and place in [place], [place]. Report from  on 7/16/22, [place] and told the  she was going to meet god. Immediately after the incident the pt was taken to half-way because of an old fine she had. On 7/18/22 Pt was brought to the [facility] from half-way due having SOB and being delusional. During this encounter pt endorse having a history being admitted to [facility], [facility], and [facility]. Also reported she do not know her diagnosis from these encounters and never been to outpatient treatment. Hospital Course: Upon admission Rachael was irritable, having delusions and withdrawal symptoms. She received buprenorphine during hospital course. Mood became stable and withdrawal symptoms have resolved. Denies suicidal ideation. Patient stabilized on meds and milieu treatment. Patient was discharged to home to continue recovery in the community.    PE: (reviewed) and labs (see medical H&PE)  Labs:    Admission on 11/05/2022   Component Date Value Ref Range Status    Acetaminophen Level 11/06/2022 <5 (A)  10 - 30 ug/mL Final    Comment: Therapeutic Range: 10.0-30.0 ug/mL  Toxic: >=150 ug/mL      WBC 11/06/2022 5.5  4.0 - 11.0 K/uL Final    RBC 11/06/2022 4.25  4.00 - 5.20 M/uL Final    Hemoglobin 11/06/2022 12.8  12.0 - 16.0 g/dL Final    Hematocrit 11/06/2022 38.6  36.0 - 48.0 % Final    MCV 11/06/2022 90.8  80.0 - 100.0 fL Final    MCH 11/06/2022 30.2  26.0 - 34.0 pg Final    MCHC 11/06/2022 33.3  31.0 - 36.0 g/dL Final    RDW 11/06/2022 15.1  12.4 - 15.4 % Final    Platelets 50/07/8113 216  135 - 450 K/uL Final    MPV 11/06/2022 9.1  5.0 - 10.5 fL Final    Neutrophils % 11/06/2022 34.8  % Final    Lymphocytes % 11/06/2022 52.8  % Final    Monocytes % 11/06/2022 10.1  % Final    Eosinophils % 11/06/2022 1.7  % Final    Basophils % 11/06/2022 0.6  % Final    Neutrophils Absolute 11/06/2022 1.9  1.7 - 7.7 K/uL Final    Lymphocytes Absolute 11/06/2022 2.9  1.0 - 5.1 K/uL Final    Monocytes Absolute 11/06/2022 0.6  0.0 - 1.3 K/uL Final    Eosinophils Absolute 11/06/2022 0.1  0.0 - 0.6 K/uL Final    Basophils Absolute 11/06/2022 0.0  0.0 - 0.2 K/uL Final    Sodium 11/06/2022 139  136 - 145 mmol/L Final    Potassium reflex Magnesium 11/06/2022 3.6  3.5 - 5.1 mmol/L Final    Chloride 11/06/2022 105  99 - 110 mmol/L Final    CO2 11/06/2022 28  21 - 32 mmol/L Final    Anion Gap 11/06/2022 6  3 - 16 Final    Glucose 11/06/2022 94  70 - 99 mg/dL Final    BUN 11/06/2022 12  7 - 20 mg/dL Final    Creatinine 11/06/2022 0.6  0.6 - 1.1 mg/dL Final    Est, Glom Filt Rate 11/06/2022 >60  >60 Final    Comment: Pediatric calculator link  Jacy.at. org/professionals/kdoqi/gfr_calculatorped  Effective Oct 3, 2022  These results are not intended for use in patients  <25years of age. eGFR results are calculated without  a race factor using the 2021 CKD-EPI equation. Careful  clinical correlation is recommended, particularly when  comparing to results calculated using previous equations.   The CKD-EPI equation is less accurate in patients with  extremes of muscle mass, extra-renal metabolism of  creatinine, excessive creatinine ingestion, or following  therapy that affects renal tubular secretion. Calcium 11/06/2022 8.7  8.3 - 10.6 mg/dL Final    Total Protein 11/06/2022 6.2 (A)  6.4 - 8.2 g/dL Final    Albumin 11/06/2022 3.8  3.4 - 5.0 g/dL Final    Albumin/Globulin Ratio 11/06/2022 1.6  1.1 - 2.2 Final    Total Bilirubin 11/06/2022 0.4  0.0 - 1.0 mg/dL Final    Alkaline Phosphatase 11/06/2022 96  40 - 129 U/L Final    ALT 11/06/2022 12  10 - 40 U/L Final    AST 11/06/2022 18  15 - 37 U/L Final    Ethanol Lvl 11/06/2022 None Detected  mg/dL Final    Comment:    None Detected  Conversion factor:  100 mg/dl = .100 g/dl  For Medical Purposes Only      Salicylate, Serum 14/76/9601 <0.3 (A)  15.0 - 30.0 mg/dL Final    Comment: Therapeutic Range: 15.0-30.0 mg/dL  Toxic: >30.0 mg/dL      Amphetamine Screen, Urine 11/05/2022 Neg  Negative <1000ng/mL Final    Barbiturate Screen, Ur 11/05/2022 Neg  Negative <200 ng/mL Final    Benzodiazepine Screen, Urine 11/05/2022 Neg  Negative <200 ng/mL Final    Cannabinoid Scrn, Ur 11/05/2022 Neg  Negative <50 ng/mL Final    Cocaine Metabolite Screen, Urine 11/05/2022 Neg  Negative <300 ng/mL Final    Opiate Scrn, Ur 11/05/2022 Neg  Negative <300 ng/mL Final    Comment: \"Therapeutic levels of pain medication, especially oxycontin and synthetic  opioids, may not be detected by this Methodology. Pain management screen  panel  Drug panel-PM-Hi Res Ur, Interp (PAIN) should be considered for drug  monitoring \". PCP Screen, Urine 11/05/2022 Neg  Negative <25 ng/mL Final    Methadone Screen, Urine 11/05/2022 Neg  Negative <300 ng/mL Final    Oxycodone Urine 11/05/2022 Neg  Negative <100 ng/ml Final    FENTANYL SCREEN, URINE 11/05/2022 Neg  Negative <5 ng/mL Final    pH, UA 11/05/2022 6.0   Final    Comment: Urine pH less than 5.0 or greater than 8.0 may indicate sample adulteration.   Another sample should be collected if clinically  indicated. Drug Screen Comment: 11/05/2022 see below   Final    Comment: This method is a screening test to detect only these drug  classes as part of a medical workup. Confirmatory testing  by another method should be ordered if clinically indicated. SARS-CoV-2, NAAT 11/06/2022 Not Detected  Not Detected Final    Comment: Rapid NAAT:   Negative results should be treated as presumptive and,  if inconsistent with clinical signs and symptoms or necessary for  patient management, should be tested with an alternative molecular  assay. Negative results do not preclude SARS-CoV-2 infection and  should not be used as the sole basis for patient management decisions. This test has been authorized by the FDA under an Emergency Use  Authorization (EUA) for use by authorized laboratories.     Fact sheet for Healthcare Providers:  http://www.peewee.karishma/  Fact sheet for Patients: http://www.leonid-noman.karishma/    METHODOLOGY: Isothermal Nucleic Acid Amplification      TSH 11/06/2022 4.14  0.27 - 4.20 uIU/mL Final        Mental Status Exam at Discharge:  Level of consciousness:  awake  Appearance:  well-appearing, in chair, good grooming and good hygiene well-developed, well-nourished  Behavior/Motor:  no abnormalities noted normal gait and station AIMS: 0  Attitude toward examiner:  cooperative, attentive and good eye contact  Speech:  spontaneous, normal rate, normal volume and well articulated  Mood:  dysthymic  Affect:  mood congruent Anxiety: mild  Hallucinations: Absent  Thought processes:  coherent Attention span, Concentration & Attention:  attention span and concentration were age appropriate  Thought content:   no evidence of delusions OCD: none    Insight: normal insight and judgment Cognition:  oriented to person, place, and time  Fund of Knowledge: average  IQ:average Memory: intact  Suicide:  No specific plan to harm self  Sleep: sleeps through the night  Appetite: ok   Reassess [place] Risk:  no specific plan to harm self Pt has phone numbers to contact if suicidal thoughts recur and states pt will return to the hospital if suicidal feelings return. Hospital Routine Meds:     nicotine  1 patch TransDERmal Daily    buprenorphine-naloxone  1 tablet SubLINGual Daily      Hospital PRN Meds: acetaminophen, magnesium hydroxide, nicotine polacrilex, aluminum & magnesium hydroxide-simethicone, hydrOXYzine HCl, OLANZapine **OR** OLANZapine (ZyPREXA) in sterile water IntraMUSCular, benztropine mesylate, traZODone   Discharge Meds:    Current Discharge Medication List             Details   !! buprenorphine-naloxone (SUBOXONE) 8-2 MG SUBL SL tablet Place 1 tablet under the tongue daily for 2 days. Qty: 2 tablet, Refills: 0    Associated Diagnoses: Opioid dependence in remission [facility])       ! ! - Potential duplicate medications found. Please discuss with provider. Details   !! buprenorphine-naloxone (SUBOXONE) 8-2 MG SUBL SL tablet 1 tablet. !! - Potential duplicate medications found. Please discuss with provider. Multiple Neuroleptics? Previous? Clozaril-No    Disposition - Residence Home or Self Care       Follow Up:  See Discharge Instructions     Addendum to NERIS winn note:  Pt seen, examined, and evaluated with NERIS winn, Gabe Reddy, who acted as my scribe for the above documentation. I have reviewed the current history, physical findings, labs, assessment and plan; and agree with note as documented.      Yannick Villagomez MD  Physician Psychiatry

## 2022-11-08 LAB
ESTIMATED AVERAGE GLUCOSE: 116.9 MG/DL
HBA1C MFR BLD: 5.7 %
